# Patient Record
Sex: FEMALE | Race: WHITE | NOT HISPANIC OR LATINO
[De-identification: names, ages, dates, MRNs, and addresses within clinical notes are randomized per-mention and may not be internally consistent; named-entity substitution may affect disease eponyms.]

---

## 2022-04-18 PROBLEM — Z00.00 ENCOUNTER FOR PREVENTIVE HEALTH EXAMINATION: Status: ACTIVE | Noted: 2022-04-18

## 2022-04-27 ENCOUNTER — ASOB RESULT (OUTPATIENT)
Age: 32
End: 2022-04-27

## 2022-04-27 ENCOUNTER — APPOINTMENT (OUTPATIENT)
Dept: ANTEPARTUM | Facility: CLINIC | Age: 32
End: 2022-04-27
Payer: SELF-PAY

## 2022-04-27 PROCEDURE — 36416 COLLJ CAPILLARY BLOOD SPEC: CPT

## 2022-04-27 PROCEDURE — 76801 OB US < 14 WKS SINGLE FETUS: CPT

## 2022-04-27 PROCEDURE — 76813 OB US NUCHAL MEAS 1 GEST: CPT

## 2022-06-16 ENCOUNTER — APPOINTMENT (OUTPATIENT)
Dept: ANTEPARTUM | Facility: CLINIC | Age: 32
End: 2022-06-16
Payer: COMMERCIAL

## 2022-06-16 ENCOUNTER — ASOB RESULT (OUTPATIENT)
Age: 32
End: 2022-06-16

## 2022-06-16 PROCEDURE — 76805 OB US >/= 14 WKS SNGL FETUS: CPT

## 2022-09-30 ENCOUNTER — ASOB RESULT (OUTPATIENT)
Age: 32
End: 2022-09-30

## 2022-09-30 ENCOUNTER — APPOINTMENT (OUTPATIENT)
Dept: ANTEPARTUM | Facility: CLINIC | Age: 32
End: 2022-09-30

## 2022-09-30 PROCEDURE — 76819 FETAL BIOPHYS PROFIL W/O NST: CPT

## 2022-09-30 PROCEDURE — 99212 OFFICE O/P EST SF 10 MIN: CPT | Mod: 25

## 2022-09-30 PROCEDURE — 76816 OB US FOLLOW-UP PER FETUS: CPT | Mod: 59

## 2022-10-14 ENCOUNTER — TRANSCRIPTION ENCOUNTER (OUTPATIENT)
Age: 32
End: 2022-10-14

## 2022-10-24 ENCOUNTER — APPOINTMENT (OUTPATIENT)
Dept: ANTEPARTUM | Facility: CLINIC | Age: 32
End: 2022-10-24

## 2022-11-09 ENCOUNTER — TRANSCRIPTION ENCOUNTER (OUTPATIENT)
Age: 32
End: 2022-11-09

## 2022-11-09 ENCOUNTER — INPATIENT (INPATIENT)
Facility: HOSPITAL | Age: 32
LOS: 2 days | Discharge: HOME | End: 2022-11-12
Attending: STUDENT IN AN ORGANIZED HEALTH CARE EDUCATION/TRAINING PROGRAM | Admitting: STUDENT IN AN ORGANIZED HEALTH CARE EDUCATION/TRAINING PROGRAM

## 2022-11-09 DIAGNOSIS — Z3A.00 WEEKS OF GESTATION OF PREGNANCY NOT SPECIFIED: ICD-10-CM

## 2022-11-09 DIAGNOSIS — O26.899 OTHER SPECIFIED PREGNANCY RELATED CONDITIONS, UNSPECIFIED TRIMESTER: ICD-10-CM

## 2022-11-10 ENCOUNTER — RESULT REVIEW (OUTPATIENT)
Age: 32
End: 2022-11-10

## 2022-11-10 ENCOUNTER — TRANSCRIPTION ENCOUNTER (OUTPATIENT)
Age: 32
End: 2022-11-10

## 2022-11-10 VITALS — SYSTOLIC BLOOD PRESSURE: 114 MMHG | TEMPERATURE: 98 F | DIASTOLIC BLOOD PRESSURE: 57 MMHG | HEART RATE: 67 BPM

## 2022-11-10 LAB
BASOPHILS # BLD AUTO: 0.03 K/UL — SIGNIFICANT CHANGE UP (ref 0–0.2)
BASOPHILS NFR BLD AUTO: 0.3 % — SIGNIFICANT CHANGE UP (ref 0–2)
BLD GP AB SCN SERPL QL: NEGATIVE — SIGNIFICANT CHANGE UP
COVID-19 SPIKE DOMAIN AB INTERP: POSITIVE
COVID-19 SPIKE DOMAIN ANTIBODY RESULT: >250 U/ML — HIGH
EOSINOPHIL # BLD AUTO: 0.03 K/UL — SIGNIFICANT CHANGE UP (ref 0–0.5)
EOSINOPHIL NFR BLD AUTO: 0.3 % — SIGNIFICANT CHANGE UP (ref 0–6)
HCT VFR BLD CALC: 37.5 % — SIGNIFICANT CHANGE UP (ref 34.5–45)
HGB BLD-MCNC: 13 G/DL — SIGNIFICANT CHANGE UP (ref 11.5–15.5)
IANC: 8.47 K/UL — HIGH (ref 1.8–7.4)
IMM GRANULOCYTES NFR BLD AUTO: 0.7 % — SIGNIFICANT CHANGE UP (ref 0–0.9)
LYMPHOCYTES # BLD AUTO: 18.1 % — SIGNIFICANT CHANGE UP (ref 13–44)
LYMPHOCYTES # BLD AUTO: 2.01 K/UL — SIGNIFICANT CHANGE UP (ref 1–3.3)
MCHC RBC-ENTMCNC: 29.9 PG — SIGNIFICANT CHANGE UP (ref 27–34)
MCHC RBC-ENTMCNC: 34.7 GM/DL — SIGNIFICANT CHANGE UP (ref 32–36)
MCV RBC AUTO: 86.2 FL — SIGNIFICANT CHANGE UP (ref 80–100)
MONOCYTES # BLD AUTO: 0.48 K/UL — SIGNIFICANT CHANGE UP (ref 0–0.9)
MONOCYTES NFR BLD AUTO: 4.3 % — SIGNIFICANT CHANGE UP (ref 2–14)
NEUTROPHILS # BLD AUTO: 8.47 K/UL — HIGH (ref 1.8–7.4)
NEUTROPHILS NFR BLD AUTO: 76.3 % — SIGNIFICANT CHANGE UP (ref 43–77)
NRBC # BLD: 0 /100 WBCS — SIGNIFICANT CHANGE UP (ref 0–0)
NRBC # FLD: 0 K/UL — SIGNIFICANT CHANGE UP (ref 0–0)
PLATELET # BLD AUTO: 174 K/UL — SIGNIFICANT CHANGE UP (ref 150–400)
RBC # BLD: 4.35 M/UL — SIGNIFICANT CHANGE UP (ref 3.8–5.2)
RBC # FLD: 12.9 % — SIGNIFICANT CHANGE UP (ref 10.3–14.5)
RH IG SCN BLD-IMP: NEGATIVE — SIGNIFICANT CHANGE UP
RH IG SCN BLD-IMP: NEGATIVE — SIGNIFICANT CHANGE UP
SARS-COV-2 IGG+IGM SERPL QL IA: >250 U/ML — HIGH
SARS-COV-2 IGG+IGM SERPL QL IA: POSITIVE
T PALLIDUM AB TITR SER: NEGATIVE — SIGNIFICANT CHANGE UP
WBC # BLD: 11.1 K/UL — HIGH (ref 3.8–10.5)
WBC # FLD AUTO: 11.1 K/UL — HIGH (ref 3.8–10.5)

## 2022-11-10 RX ORDER — TETANUS TOXOID, REDUCED DIPHTHERIA TOXOID AND ACELLULAR PERTUSSIS VACCINE, ADSORBED 5; 2.5; 8; 8; 2.5 [IU]/.5ML; [IU]/.5ML; UG/.5ML; UG/.5ML; UG/.5ML
0.5 SUSPENSION INTRAMUSCULAR ONCE
Refills: 0 | Status: DISCONTINUED | OUTPATIENT
Start: 2022-11-10 | End: 2022-11-12

## 2022-11-10 RX ORDER — ERTAPENEM SODIUM 1 G/1
1000 INJECTION, POWDER, LYOPHILIZED, FOR SOLUTION INTRAMUSCULAR; INTRAVENOUS EVERY 24 HOURS
Refills: 0 | Status: COMPLETED | OUTPATIENT
Start: 2022-11-11 | End: 2022-11-11

## 2022-11-10 RX ORDER — DIPHENHYDRAMINE HCL 50 MG
25 CAPSULE ORAL EVERY 6 HOURS
Refills: 0 | Status: DISCONTINUED | OUTPATIENT
Start: 2022-11-10 | End: 2022-11-12

## 2022-11-10 RX ORDER — LANOLIN
1 OINTMENT (GRAM) TOPICAL EVERY 6 HOURS
Refills: 0 | Status: DISCONTINUED | OUTPATIENT
Start: 2022-11-10 | End: 2022-11-12

## 2022-11-10 RX ORDER — IBUPROFEN 200 MG
600 TABLET ORAL EVERY 6 HOURS
Refills: 0 | Status: COMPLETED | OUTPATIENT
Start: 2022-11-10 | End: 2023-10-09

## 2022-11-10 RX ORDER — SODIUM CHLORIDE 9 MG/ML
1000 INJECTION, SOLUTION INTRAVENOUS
Refills: 0 | Status: DISCONTINUED | OUTPATIENT
Start: 2022-11-10 | End: 2022-11-10

## 2022-11-10 RX ORDER — LANOLIN
1 OINTMENT (GRAM) TOPICAL
Qty: 0 | Refills: 0 | DISCHARGE
Start: 2022-11-10

## 2022-11-10 RX ORDER — MAGNESIUM HYDROXIDE 400 MG/1
30 TABLET, CHEWABLE ORAL
Refills: 0 | Status: DISCONTINUED | OUTPATIENT
Start: 2022-11-10 | End: 2022-11-12

## 2022-11-10 RX ORDER — SIMETHICONE 80 MG/1
80 TABLET, CHEWABLE ORAL EVERY 4 HOURS
Refills: 0 | Status: DISCONTINUED | OUTPATIENT
Start: 2022-11-10 | End: 2022-11-12

## 2022-11-10 RX ORDER — IBUPROFEN 200 MG
1 TABLET ORAL
Qty: 0 | Refills: 0 | DISCHARGE
Start: 2022-11-10

## 2022-11-10 RX ORDER — ERTAPENEM SODIUM 1 G/1
1000 INJECTION, POWDER, LYOPHILIZED, FOR SOLUTION INTRAMUSCULAR; INTRAVENOUS ONCE
Refills: 0 | Status: COMPLETED | OUTPATIENT
Start: 2022-11-10 | End: 2022-11-10

## 2022-11-10 RX ORDER — OXYCODONE HYDROCHLORIDE 5 MG/1
5 TABLET ORAL ONCE
Refills: 0 | Status: DISCONTINUED | OUTPATIENT
Start: 2022-11-10 | End: 2022-11-12

## 2022-11-10 RX ORDER — HEPARIN SODIUM 5000 [USP'U]/ML
5000 INJECTION INTRAVENOUS; SUBCUTANEOUS EVERY 12 HOURS
Refills: 0 | Status: DISCONTINUED | OUTPATIENT
Start: 2022-11-10 | End: 2022-11-12

## 2022-11-10 RX ORDER — OXYTOCIN 10 UNIT/ML
333.33 VIAL (ML) INJECTION
Qty: 20 | Refills: 0 | Status: DISCONTINUED | OUTPATIENT
Start: 2022-11-10 | End: 2022-11-10

## 2022-11-10 RX ORDER — OXYCODONE HYDROCHLORIDE 5 MG/1
5 TABLET ORAL
Refills: 0 | Status: DISCONTINUED | OUTPATIENT
Start: 2022-11-10 | End: 2022-11-12

## 2022-11-10 RX ORDER — ACETAMINOPHEN 500 MG
975 TABLET ORAL
Refills: 0 | Status: DISCONTINUED | OUTPATIENT
Start: 2022-11-10 | End: 2022-11-12

## 2022-11-10 RX ORDER — ERTAPENEM SODIUM 1 G/1
INJECTION, POWDER, LYOPHILIZED, FOR SOLUTION INTRAMUSCULAR; INTRAVENOUS
Refills: 0 | Status: COMPLETED | OUTPATIENT
Start: 2022-11-10 | End: 2022-11-11

## 2022-11-10 RX ORDER — SIMETHICONE 80 MG/1
1 TABLET, CHEWABLE ORAL
Qty: 0 | Refills: 0 | DISCHARGE
Start: 2022-11-10

## 2022-11-10 RX ORDER — CHLORHEXIDINE GLUCONATE 213 G/1000ML
1 SOLUTION TOPICAL ONCE
Refills: 0 | Status: DISCONTINUED | OUTPATIENT
Start: 2022-11-10 | End: 2022-11-10

## 2022-11-10 RX ORDER — CITRIC ACID/SODIUM CITRATE 300-500 MG
15 SOLUTION, ORAL ORAL EVERY 6 HOURS
Refills: 0 | Status: DISCONTINUED | OUTPATIENT
Start: 2022-11-10 | End: 2022-11-10

## 2022-11-10 RX ORDER — KETOROLAC TROMETHAMINE 30 MG/ML
30 SYRINGE (ML) INJECTION EVERY 6 HOURS
Refills: 0 | Status: DISCONTINUED | OUTPATIENT
Start: 2022-11-10 | End: 2022-11-11

## 2022-11-10 RX ORDER — INFLUENZA VIRUS VACCINE 15; 15; 15; 15 UG/.5ML; UG/.5ML; UG/.5ML; UG/.5ML
0.5 SUSPENSION INTRAMUSCULAR ONCE
Refills: 0 | Status: COMPLETED | OUTPATIENT
Start: 2022-11-10 | End: 2022-11-11

## 2022-11-10 RX ADMIN — Medication 30 MILLIGRAM(S): at 18:30

## 2022-11-10 RX ADMIN — Medication 975 MILLIGRAM(S): at 20:20

## 2022-11-10 RX ADMIN — Medication 975 MILLIGRAM(S): at 15:35

## 2022-11-10 RX ADMIN — ERTAPENEM SODIUM 1000 MILLIGRAM(S): 1 INJECTION, POWDER, LYOPHILIZED, FOR SOLUTION INTRAMUSCULAR; INTRAVENOUS at 10:47

## 2022-11-10 RX ADMIN — Medication 975 MILLIGRAM(S): at 15:02

## 2022-11-10 RX ADMIN — Medication 975 MILLIGRAM(S): at 21:00

## 2022-11-10 RX ADMIN — Medication 1000 MILLIUNIT(S)/MIN: at 10:47

## 2022-11-10 RX ADMIN — HEPARIN SODIUM 5000 UNIT(S): 5000 INJECTION INTRAVENOUS; SUBCUTANEOUS at 15:30

## 2022-11-10 RX ADMIN — Medication 30 MILLIGRAM(S): at 23:43

## 2022-11-10 RX ADMIN — Medication 15 MILLILITER(S): at 06:33

## 2022-11-10 RX ADMIN — Medication 30 MILLIGRAM(S): at 18:00

## 2022-11-10 NOTE — OB PROVIDER LABOR PROGRESS NOTE - ASSESSMENT
Labor  - Pt is making cervical change  - Continue expt mgmt    Laurie Gudino, PGY1  d/w Dr. Iyer
-pt in labor  -terb given x1  -pt repositioned  -cw FRT monitoring, reassess situation as indicated.     Gerry Blanco PGY 1

## 2022-11-10 NOTE — OB RN PATIENT PROFILE - FALL HARM RISK - UNIVERSAL INTERVENTIONS
Bed in lowest position, wheels locked, appropriate side rails in place/Call bell, personal items and telephone in reach/Instruct patient to call for assistance before getting out of bed or chair/Non-slip footwear when patient is out of bed/Fox to call system/Physically safe environment - no spills, clutter or unnecessary equipment/Purposeful Proactive Rounding/Room/bathroom lighting operational, light cord in reach

## 2022-11-10 NOTE — OB PROVIDER DELIVERY SUMMARY - NSPROVIDERDELIVERYNOTE_OBGYN_ALL_OB_FT
Delivery of live born Male infant.  APGARS 9/9.  3740g.  Vertex presentation.  Emergent delivery due to abnormal fetal status.    Vaccuum assisted delivery through hysterotomy.  Hysterotomy closed in single layer.  Surgicell powder placed over hysterotomy.  Grossly normal tubes and ovaries.  QBL: 237  IVF: 1500  UOP: 350    Dictation #[id number] ([place]deep)

## 2022-11-10 NOTE — DISCHARGE NOTE OB - MEDICATION SUMMARY - MEDICATIONS TO TAKE
I will START or STAY ON the medications listed below when I get home from the hospital:    ibuprofen 600 mg oral tablet  -- 1 tab(s) by mouth every 6 hours, As Needed  -- Indication: For pain    lanolin topical ointment  -- 1 application on skin every 6 hours, As needed, Sore Nipples  -- Indication: For breast nipple pain    simethicone 80 mg oral tablet, chewable  -- 1 tab(s) by mouth every 4 hours, As needed, Gas  -- Indication: For gas pain

## 2022-11-10 NOTE — OB RN INTRAOPERATIVE NOTE - NSSELHIDDEN_OBGYN_ALL_OB_FT
[NS_DeliveryAttending1_OBGYN_ALL_OB_FT:[email or web address]=],[NS_DeliveryRN_OBGYN_ALL_OB_FT:[id number]==]

## 2022-11-10 NOTE — CHART NOTE - NSCHARTNOTEFT_GEN_A_CORE
Patient seen and examined at bedside for prolonged deceleration. VE: 8/100/-2, AROM, IUPC and ISE placed. Tracing recovered to baseline with moderate variability. After AROM variable decelerations noted, amnioinfusion started. Continue to monitor closely.

## 2022-11-10 NOTE — OB NEONATOLOGY/PEDIATRICIAN DELIVERY SUMMARY - NSPEDSNEONOTESA_OBGYN_ALL_OB_FT
Peds called to delivery for  code 100 fetal bradycardia. NICU resuscitation team present: Fellow Dr. Plascencia and attending Dr. Rodriguez. 41.1 wk male born via emergency vacuum assisted CS popoffx1 to a 31 y/o  blood type B- mother, rhogam . No significant maternal history. Prenatal history of macrocephaly at 90th%ile, amnioinfusion at 0640 on 11/10. PNL -/-/NR/I, GBS - on 10/19. SROM at 0608 with clear fluids, approx. 3 hrs. Baby emerged vigorous, crying, was w/d/s/s with APGARS of 9/9. Mom plans to breast feed, declines Hep B vaccine and declines circ. EOS 0.10. COVID negative. Baby stable for admission to  nursery. Parents updated.  BW: 3740

## 2022-11-10 NOTE — CHART NOTE - NSCHARTNOTEFT_GEN_A_CORE
Patient seen and examined at bedside for prolonged deceleration. VE: 9.5/100/0, IUPC/ISE in place, amnioinfusion running. Terbutaline 0.25mg x1 given. FHR recovered with moderate variability. Moved into right lateral position. Continue to monitor closely.

## 2022-11-10 NOTE — OB PROVIDER DELIVERY SUMMARY - NSSELHIDDEN_OBGYN_ALL_OB_FT
[NS_DeliveryAttending1_OBGYN_ALL_OB_FT:[email or web address]=],[NS_DeliveryRN_OBGYN_ALL_OB_FT:[id number]==]

## 2022-11-10 NOTE — DISCHARGE NOTE OB - CARE PROVIDER_API CALL
Sim Collado)  Obstetrics and Gynecology  [address] [place] [address]  Phone: [phone]  Fax: [phone]  Follow Up Time: 2 weeks

## 2022-11-10 NOTE — DISCHARGE NOTE OB - PATIENT PORTAL LINK FT
You can access the FollowMyHealth Patient Portal offered by [facility] by registering at the following website: [email or web address]. By joining [employer]’s FollowMyHealth portal, you will also be able to view your health information using other applications (apps) compatible with our system.

## 2022-11-10 NOTE — OB PROVIDER H&P - HISTORY OF PRESENT ILLNESS
31yo  @41w1d  p/f IOL for LT. She has been feeling CTX since 6pm that were 7-8min apart. They are now coming 2 min apart. –VB, -LOF, +FM. Pt denies fever, chills, nausea, vomiting, diarrhea, headache, constipation, dizziness, syncope, chest pain, palpitations, shortness of breath, dysuria, urgency, frequency.  PNC: unc  GBS: neg  EFW: 9#  ObHx:   GynHx: denies  MedHx: denies  SrgHx: denies  PsychHx: denies  SocialHx: denies  AllergyHx: NKDA  RxHx: took PNV at beginning of pregnancy but has since discontinued

## 2022-11-10 NOTE — OB PROVIDER H&P - ASSESSMENT
31yo  @41w1d initially presented for LT IOL, found to be in active labor  -admit to L&D  - GBS neg  - EFW 4082  - Routine Labs  - FHT/TOCO  - Anesthesia Consult  - Anticipate     Laurie Gudino, PGY1  d/w Dr. Iyer

## 2022-11-10 NOTE — OB RN DELIVERY SUMMARY - NSSELHIDDEN_OBGYN_ALL_OB_FT
[NS_DeliveryAttending1_OBGYN_ALL_OB_FT:[email or web address]=],[NS_DeliveryRN_OBGYN_ALL_OB_FT:[id number]]

## 2022-11-10 NOTE — DISCHARGE NOTE OB - NS AS DC FOLLOWUP STROKE INST
Influenza vaccination (VIS Pub Date: August 6, 2021) pp educational materials provided/Influenza vaccination (VIS Pub Date: August 6, 2021)

## 2022-11-10 NOTE — OB RN DELIVERY SUMMARY - NS_SEPSISRSKCALC_OBGYN_ALL_OB_FT
EOS calculated successfully. EOS Risk Factor: 0.5/1000 live births ([facility] national incidence); GA=41w1d; Temp=98.42; ROM=2.817; GBS='Negative'; Antibiotics='No antibiotics or any antibiotics < 2 hrs prior to birth'

## 2022-11-10 NOTE — DISCHARGE NOTE OB - MATERIALS PROVIDED
[facility] Screening Program/Guide to Postpartum Care/Shaken Baby Prevention Handout/Birth Certificate Instructions

## 2022-11-10 NOTE — OB PROVIDER LABOR PROGRESS NOTE - NS_SUBJECTIVE/OBJECTIVE_OBGYN_ALL_OB_FT
Patient seen for late decel for 2 min, nursing and MDs entered room repositioned pt and terb x1 was given. FRT recovered and pt clinically stable.
Pt checked for labor progress

## 2022-11-10 NOTE — OB PROVIDER LABOR PROGRESS NOTE - NS_EFFACEMANT_OBGYN_ALL_OB_NU
9/14/2021         RE: Ed Rudolph  [address]  [facility] [address]        Dear Colleague,    Thank you for referring your patient, Ed Rudolph, to the [facility] [place]. Please see a copy of my visit note below.    ___________________________________    SUBJECTIVE:  HPI:  Ed Rudolph  Is a 62 year old male who presents today for new patient evaluation of low back pain onset was 3 months ago after he helped a friend move furniture and boxes.  The next day he was stiff in his low back and gradually over the next few days he started noticing right lateral calf pain.  That has progressed to some tingling in the lateral calf into all 5 toes of the foot.  He has not noticed weakness, bowel or bladder dysfunction, sexual dysfunction, or saddle anesthesia.  There is been no change in his balance.  His sensation in the foot is normal although it tingles.    Symptoms improved with sitting  Symptoms worsened with walking, sleeping, and bending.    -Treatment to Date: He tried meloxicam but stopped that because it made him sleepy.  He is taking tizanidine currently and thinks it is helping.  He has not had physical therapy or chiropractic care.  MEDICATIONS:  Reviewed.     ALLERGIES:  Reviewed.   PAIN SCORE/DIAGRAM pain diagram shows pain in the right SI joint radiating diffusely down the right leg.  Pain initially 7, worst 9, best 8.  Following OMT, pain went to 4-5  OSWESTRY DISABILITY INDEX = 40%    Current Outpatient Medications   Medication     lisinopril-hydrochlorothiazide (ZESTORETIC) 20-25 MG tablet     meloxicam (MOBIC) 15 MG tablet     Multiple Vitamin (DAILY VITAMINS PO)     simvastatin (ZOCOR) 40 MG tablet     tiZANidine (ZANAFLEX) 2 MG tablet     traZODone (DESYREL) 50 MG tablet     No current facility-administered medications for this visit.       No Known Allergies    Past Medical History:   Diagnosis Date     Adjustment disorder with depressed 
mood 1/12/2016     Carpal tunnel syndrome 1/5/2007     Chest wall contusion 12/2/2015     Depression with suicidal ideation 12/21/2015     Erectile Dysfunction 1/5/2007     Hyperlipidemia LDL goal <130 10/31/2010     Hypertension goal BP (blood pressure) < 140/90 5/11/2011     Impaired fasting glucose      LEFT FRONTAL LOBE ABSCESS 1/03    treated surgically, resolved     LFT elevation 5/30/2012        Patient Active Problem List   Diagnosis     Erectile Dysfunction     Overweight (BMI 25.0-29.9)     Hyperlipidemia LDL goal <100     Essential hypertension with goal blood pressure less than 140/90     Impaired fasting glucose     High triglycerides     Prostatic nodule     RLS (restless legs syndrome)     Primary insomnia     Skin cyst     Elevated ferritin     Keloid scar, right neck     Fall     Major depressive disorder, recurrent episode (H)     Slac (scapholunate advanced collapse) of wrist, left     Alcohol dependence (H)       Past Surgical History:   Procedure Laterality Date     COLONOSCOPY WITH CO2 INSUFFLATION N/A 4/12/2018    Procedure: COLONOSCOPY WITH CO2 INSUFFLATION;  COLON SCREEN/ ARREDONDO;  Surgeon: Quintin Patton MD;  Location: [facility] OR     CRANIOTOMY  01/2003    x 2, for treatment of brain abscess       Family History   Adopted: Yes   Problem Relation Age of Onset     Family History Negative No family hx of         Unknown, adopted       Reviewed past medical, surgical, and family history with patient found on new patient intake packet located in EMR Media tab.     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  He moved here from [place] approximately 15 years ago.  He used to play football but not any longer.  He still does shoot around some basketballs and did that a few days ago.    ROS:Specifically negative for bowel/bladder dysfunction, balance changes, headache, dizziness, foot drop, fevers, chills, appetite changes, nausea/vomiting, unexplained weight loss. 
Otherwise 13 systems reviewed and noncontributory to the chief complaint. Please see the patient's intake questionnaire from today for details.    OBJECTIVE:  Vital signs Per the nurse's note    PHYSICAL EXAMINATION:    --CONSTITUTIONAL:  Vital signs as above.  No acute distress.  The patient is well nourished and well groomed.  --PSYCHIATRIC:  Appropriate mood and affect. The patient is awake, alert, oriented to person, place, time and answering questions appropriately with clear speech.    --SKIN:  Skin over the face, bilateral lower extremities, and posterior torso is clean, dry, intact without rashes.    --RESPIRATORY: Normal rhythm and effort. No abnormal accessory muscle breathing patterns noted.   --ABDOMINAL:  Non-distended.  Nontender  --STANDING EXAMINATION:  Normal lumbar lordosis noted, no lateral shift.  --MUSCULOSKELETAL: Lumbar spine inspection reveals no evidence of deformity. Range of motion is initially limited in lumbar flexion with moderate pain hands to the knees.  Extension was 50% reduced side bending and rotation were normal and painless.. No tenderness to palpation lumbar spine. Straight leg raising in the seated position is was initially positive for roughly right L5 radicular pain on the right and was negative on the left.  Kemps test was positive to the right and both before and after manual medicine    Following manual medicine, range of motion was full in flexion with minimal pain and extension range was normal.  --PELVIC/HIP JOINTS:  Standing flexion positive right, stork test negative, Brenda's sign negative.  Leg lengths initially uneven, but became equal after manual medicine.   Long Sitting test right leg goes from 1 cm short to 2 cm short, and this resolved after manual medicine to equal.  Negative Hector's, negative piriformis, negative hip scour,   Hip Impingement, negative.  Spring testing positive over the right SI joint for pulse provocative pain, slightly positive over the 
L3-L5 spinous processes for nonconcordant pain..      PELVIC ALIGNMENT right posterior innominate rotation.         --NEUROLOGICAL:    --LOWER EXTREMITY MOTOR TESTING:  REFLEXES:  2/4 patellar,  And 0/4 achilles reflexes bilaterally.  GROSS MOTOR: Gait is non-antalgic. Easily arises from a seated position. Flat foot, heel and toe walking, squatting and risng are normal without significant difficulty.  Duck walk was painful on the right  MANUAL MOTOR TESTING:  L3- S1 Myotomes 5/5.  SENSATION to light touch is diminished in the right foot and roughly an L5  distribution, but otherwise was intact in the bilateral L4, L5, and S1  dermatomes.     DURAL STRETCH TESTS:  Seated SLR positive on the right, increased with ankle dorsiflexion, and became negative after OMT.  --VASCULAR: Femoral pulses 2+. Lower extremity capillary refill, temperature and color normal.     RESULTS: Prior medical records from [facility] Everywhere were reviewed today.    Imaging: Lumbar spine Imaging was personally reviewed and interpreted today. The images were shown to the patient and the findings were explained using a spine model.      MR Lumbar Spine w/o Contrast    Result Date: 8/27/2021  MR LUMBAR SPINE WITHOUT CONTRAST 8/26/2021   IMPRESSION: 1.  Multilevel degenerative disc disease is moderate at L5-S1 and more mild elsewhere. Accompanying small diffuse posterior disc bulges contribute to a multilevel mild spinal canal stenosis. 2.  Type I Modic endplate degenerative change at L5-S1 with smaller components of type I Modic endplate degenerative change anteriorly at L1-L2 and T11-T12. Additional degenerative osseous edema at the bilateral L3-L4 and left L4-L5 facets. 3.  Facet arthropathy is most pronounced at the L3-L4 level, where it is moderate bilaterally. 4.  Moderate right and moderate-to-severe left L5-S1 neural foraminal stenosis with contact of the exiting left L5 nerve. Additional mild-to-moderate bilateral L4-L5 
neural foraminal stenosis with mild-to-moderate left L3-L4 neural foraminal stenosis. 5.  Chronic compression fracture with mild height loss along the superior endplate of L1. 6.  Epidural lipomatosis throughout the lumbar spine with an accompanying thecal sac effacement as detailed above.    PROCEDURE NOTE: OMT to the right posterior innominate rotation realigned the pelvic structures.  Improvement in range of motion and function noted.  Diminished sensation in a right L5 pattern improved but was still somewhat abnormal in the foot and toe compared to previous.  The leg raise testing improved and was normal afterwards.    ASSESSMENT: Ed Rudolph is a 62 year old male who presents for consultation at the request of PCP Aris Garcia,who presents today for new patient evaluation of low back pain with right L5 radicular symptoms of 3 months duration.  He also had a pelvic joint dysfunction today manifesting as a right posterior innominate rotation.  He responded nicely to an attempt at manual medicine with resolution of some of his neurologic findings and improvement in his L5 symptoms as well as low back pain.  His function also improved in the room.   No myelopathic or red flag symptoms.        There are no diagnoses linked to this encounter.  PLAN:  Reviewed spine anatomy and disease process. Discussed diagnosis and treatment options with the patient today.   -DIAGNOSTIC TESTS:  Images were personally reviewed and interpreted and explained to patient today using spine model.   No further testing today    -PHYSICAL THERAPY: None ordered for now but we will reconsider this after the next visit  Discussed the importance of easy walking on level ground, gradually increasing bending stooping and twisting as tolerated, but avoiding more than 20 pounds of lifting for now.  -MEDICATIONS: I advised holding on the tizanidine unless he notices a significant increase in pain off of it.  He has already stopped the 
meloxicam.  Discussed multiple medication options today with patient. Discussed risks, side effects, and proper use of medications. Patient verbalized understanding.    -PATIENT EDUCATION:  Total time of 60 minutes spent with the patient, reviewing the chart, placing orders, counseling, education, coordination of care, and documenting.     -FOLLOW-UP: 2 weeks  Advised patient to call the [facility] if symptoms worsen or you have problems controlling bladder and bowel function.   ___________________________________             Again, thank you for allowing me to participate in the care of your patient.        Sincerely,        Sravan Kumar MD    
100
90

## 2022-11-11 LAB
BASOPHILS # BLD AUTO: 0.01 K/UL — SIGNIFICANT CHANGE UP (ref 0–0.2)
BASOPHILS NFR BLD AUTO: 0.1 % — SIGNIFICANT CHANGE UP (ref 0–2)
EOSINOPHIL # BLD AUTO: 0.03 K/UL — SIGNIFICANT CHANGE UP (ref 0–0.5)
EOSINOPHIL NFR BLD AUTO: 0.3 % — SIGNIFICANT CHANGE UP (ref 0–6)
HCT VFR BLD CALC: 32 % — LOW (ref 34.5–45)
HGB BLD-MCNC: 10.8 G/DL — LOW (ref 11.5–15.5)
IANC: 8.04 K/UL — HIGH (ref 1.8–7.4)
IMM GRANULOCYTES NFR BLD AUTO: 0.5 % — SIGNIFICANT CHANGE UP (ref 0–0.9)
KLEIHAUER-BETKE CALCULATION: 0 % — SIGNIFICANT CHANGE UP
LYMPHOCYTES # BLD AUTO: 1.42 K/UL — SIGNIFICANT CHANGE UP (ref 1–3.3)
LYMPHOCYTES # BLD AUTO: 14.1 % — SIGNIFICANT CHANGE UP (ref 13–44)
MCHC RBC-ENTMCNC: 29.8 PG — SIGNIFICANT CHANGE UP (ref 27–34)
MCHC RBC-ENTMCNC: 33.8 GM/DL — SIGNIFICANT CHANGE UP (ref 32–36)
MCV RBC AUTO: 88.4 FL — SIGNIFICANT CHANGE UP (ref 80–100)
MONOCYTES # BLD AUTO: 0.54 K/UL — SIGNIFICANT CHANGE UP (ref 0–0.9)
MONOCYTES NFR BLD AUTO: 5.4 % — SIGNIFICANT CHANGE UP (ref 2–14)
NEUTROPHILS # BLD AUTO: 8.04 K/UL — HIGH (ref 1.8–7.4)
NEUTROPHILS NFR BLD AUTO: 79.6 % — HIGH (ref 43–77)
NRBC # BLD: 0 /100 WBCS — SIGNIFICANT CHANGE UP (ref 0–0)
NRBC # FLD: 0 K/UL — SIGNIFICANT CHANGE UP (ref 0–0)
PLATELET # BLD AUTO: 151 K/UL — SIGNIFICANT CHANGE UP (ref 150–400)
RBC # BLD: 3.62 M/UL — LOW (ref 3.8–5.2)
RBC # FLD: 13.2 % — SIGNIFICANT CHANGE UP (ref 10.3–14.5)
WBC # BLD: 10.09 K/UL — SIGNIFICANT CHANGE UP (ref 3.8–10.5)
WBC # FLD AUTO: 10.09 K/UL — SIGNIFICANT CHANGE UP (ref 3.8–10.5)

## 2022-11-11 RX ORDER — IBUPROFEN 200 MG
600 TABLET ORAL EVERY 6 HOURS
Refills: 0 | Status: DISCONTINUED | OUTPATIENT
Start: 2022-11-11 | End: 2022-11-12

## 2022-11-11 RX ADMIN — SIMETHICONE 80 MILLIGRAM(S): 80 TABLET, CHEWABLE ORAL at 17:57

## 2022-11-11 RX ADMIN — Medication 975 MILLIGRAM(S): at 21:45

## 2022-11-11 RX ADMIN — HEPARIN SODIUM 5000 UNIT(S): 5000 INJECTION INTRAVENOUS; SUBCUTANEOUS at 03:37

## 2022-11-11 RX ADMIN — Medication 975 MILLIGRAM(S): at 03:37

## 2022-11-11 RX ADMIN — SIMETHICONE 80 MILLIGRAM(S): 80 TABLET, CHEWABLE ORAL at 12:45

## 2022-11-11 RX ADMIN — Medication 30 MILLIGRAM(S): at 00:28

## 2022-11-11 RX ADMIN — Medication 975 MILLIGRAM(S): at 21:05

## 2022-11-11 RX ADMIN — INFLUENZA VIRUS VACCINE 0.5 MILLILITER(S): 15; 15; 15; 15 SUSPENSION INTRAMUSCULAR at 21:05

## 2022-11-11 RX ADMIN — Medication 975 MILLIGRAM(S): at 08:30

## 2022-11-11 RX ADMIN — HEPARIN SODIUM 5000 UNIT(S): 5000 INJECTION INTRAVENOUS; SUBCUTANEOUS at 15:35

## 2022-11-11 RX ADMIN — Medication 600 MILLIGRAM(S): at 17:55

## 2022-11-11 RX ADMIN — ERTAPENEM SODIUM 120 MILLIGRAM(S): 1 INJECTION, POWDER, LYOPHILIZED, FOR SOLUTION INTRAMUSCULAR; INTRAVENOUS at 09:38

## 2022-11-11 RX ADMIN — Medication 600 MILLIGRAM(S): at 18:25

## 2022-11-11 RX ADMIN — Medication 30 MILLIGRAM(S): at 06:00

## 2022-11-11 RX ADMIN — Medication 975 MILLIGRAM(S): at 15:35

## 2022-11-11 RX ADMIN — Medication 975 MILLIGRAM(S): at 09:00

## 2022-11-11 RX ADMIN — Medication 30 MILLIGRAM(S): at 05:45

## 2022-11-11 RX ADMIN — Medication 600 MILLIGRAM(S): at 12:44

## 2022-11-11 RX ADMIN — Medication 975 MILLIGRAM(S): at 04:10

## 2022-11-11 RX ADMIN — Medication 975 MILLIGRAM(S): at 16:05

## 2022-11-11 RX ADMIN — Medication 600 MILLIGRAM(S): at 13:15

## 2022-11-12 VITALS
RESPIRATION RATE: 18 BRPM | HEART RATE: 82 BPM | DIASTOLIC BLOOD PRESSURE: 75 MMHG | OXYGEN SATURATION: 100 % | SYSTOLIC BLOOD PRESSURE: 122 MMHG | TEMPERATURE: 98 F

## 2022-11-12 RX ORDER — SENNA PLUS 8.6 MG/1
1 TABLET ORAL
Refills: 0 | Status: DISCONTINUED | OUTPATIENT
Start: 2022-11-12 | End: 2022-11-12

## 2022-11-12 RX ORDER — FERROUS SULFATE 325(65) MG
325 TABLET ORAL DAILY
Refills: 0 | Status: DISCONTINUED | OUTPATIENT
Start: 2022-11-12 | End: 2022-11-12

## 2022-11-12 RX ADMIN — Medication 1 TABLET(S): at 15:04

## 2022-11-12 RX ADMIN — Medication 600 MILLIGRAM(S): at 06:00

## 2022-11-12 RX ADMIN — Medication 975 MILLIGRAM(S): at 03:45

## 2022-11-12 RX ADMIN — Medication 975 MILLIGRAM(S): at 03:04

## 2022-11-12 RX ADMIN — SIMETHICONE 80 MILLIGRAM(S): 80 TABLET, CHEWABLE ORAL at 06:22

## 2022-11-12 RX ADMIN — Medication 600 MILLIGRAM(S): at 00:45

## 2022-11-12 RX ADMIN — Medication 975 MILLIGRAM(S): at 15:03

## 2022-11-12 RX ADMIN — Medication 975 MILLIGRAM(S): at 10:40

## 2022-11-12 RX ADMIN — Medication 600 MILLIGRAM(S): at 06:45

## 2022-11-12 RX ADMIN — HEPARIN SODIUM 5000 UNIT(S): 5000 INJECTION INTRAVENOUS; SUBCUTANEOUS at 03:03

## 2022-11-12 RX ADMIN — Medication 975 MILLIGRAM(S): at 09:41

## 2022-11-12 RX ADMIN — Medication 600 MILLIGRAM(S): at 00:00

## 2022-11-12 NOTE — PROGRESS NOTE ADULT - SUBJECTIVE AND OBJECTIVE BOX
Pain Service Follow-up  Postop Day  1    S/P  C- Section    T(C): 36.7 (11-11-22 @ 06:14), Max: 36.9 (11-10-22 @ 13:50)  HR: 72 (11-11-22 @ 06:14) (64 - 100)  BP: 101/62 (11-11-22 @ 06:30) (95/75 - 150/83)  RR: 16 (11-11-22 @ 06:14) (9 - 23)  SpO2: 99% (11-11-22 @ 06:14) (96% - 100%)  Wt(kg): --      THERAPY:     S/P Epidural Morphine    Sedation Score:	  [X] Alert	      [  ] Drowsy       [  ] Arousable	[  ] Asleep         [  ] Unresponsive    Side Effects:	  [X] None	      [  ] Nausea       [  ] Pruritus        [  ] Weakness   [  ] Numbness        ASSESSMENT/ PLAN   [ X ] Discontinue         [  ] Continue    [ X ] Documentation and Verification of current medications       Satisfactory Post Anesthetic Course    
Post-Operative Note, C/S  She is a  32y woman who is now post-operative day: 2    Subjective:  The patient feels well.  She is ambulating.   She is tolerating regular diet.  She denies nausea and vomiting; denies fever.  She is voiding.  Her pain is controlled; incisional pain is appropriate.  She reports normal postpartum bleeding.  She is breastfeeding.  She is formula feeding.    Physical exam:    Vital Signs Last 24 Hrs  T(C): 36.7 (12 Nov 2022 06:13), Max: 36.8 (11 Nov 2022 10:30)  T(F): 98.1 (12 Nov 2022 06:13), Max: 98.3 (11 Nov 2022 10:30)  HR: 75 (12 Nov 2022 06:13) (74 - 81)  BP: 119/64 (12 Nov 2022 06:13) (109/64 - 125/71)  BP(mean): --  RR: 18 (12 Nov 2022 06:13) (17 - 18)  SpO2: 98% (12 Nov 2022 06:13) (98% - 99%)    Parameters below as of 11 Nov 2022 21:26  Patient On (Oxygen Delivery Method): room air        Gen: NAD  Breast: Soft, nontender, not engorged.  Abdomen: Soft, nontender, no distension , firm uterine fundus at umbilicus.  Incision: C/D/I.  Pelvic: Normal lochia noted  Ext: No calf tenderness    LABS:                        10.8   10.09 )-----------( 151      ( 11 Nov 2022 05:32 )             32.0       Rubella status:     Allergies    No Known Allergies    Intolerances      MEDICATIONS  (STANDING):  acetaminophen     Tablet .. 975 milliGRAM(s) Oral <User Schedule>  diphtheria/tetanus/pertussis (acellular) Vaccine (Adacel) 0.5 milliLiter(s) IntraMuscular once  ferrous    sulfate 325 milliGRAM(s) Oral daily  heparin   Injectable 5000 Unit(s) SubCutaneous every 12 hours  ibuprofen  Tablet. 600 milliGRAM(s) Oral every 6 hours  prenatal multivitamin 1 Tablet(s) Oral daily  senna 1 Tablet(s) Oral two times a day    MEDICATIONS  (PRN):  diphenhydrAMINE 25 milliGRAM(s) Oral every 6 hours PRN Pruritus  lanolin Ointment 1 Application(s) Topical every 6 hours PRN Sore Nipples  magnesium hydroxide Suspension 30 milliLiter(s) Oral two times a day PRN Constipation  oxyCODONE    IR 5 milliGRAM(s) Oral every 3 hours PRN Moderate to Severe Pain (4-10)  oxyCODONE    IR 5 milliGRAM(s) Oral once PRN Moderate to Severe Pain (4-10)  simethicone 80 milliGRAM(s) Chew every 4 hours PRN Gas            
Post-Operative Note, vacuum assisted C/S  She is a  32y woman who is now post-operative day: 1    Subjective:  The patient feels well.  She is ambulating.   She is tolerating regular diet.  She denies nausea and vomiting; denies fever.  She is voiding.  Her pain is controlled; incisional pain is appropriate.  She reports normal postpartum bleeding.  She is breastfeeding and formula feeding.    Physical exam:    Vital Signs Last 24 Hrs  T(C): 36.8 (11 Nov 2022 10:30), Max: 36.9 (10 Nov 2022 13:50)  T(F): 98.3 (11 Nov 2022 10:30), Max: 98.5 (10 Nov 2022 13:50)  HR: 81 (11 Nov 2022 10:30) (72 - 83)  BP: 125/71 (11 Nov 2022 10:30) (99/51 - 129/76)  BP(mean): 88 (10 Nov 2022 12:00) (88 - 88)  RR: 16 (11 Nov 2022 06:14) (16 - 18)  SpO2: 99% (11 Nov 2022 10:30) (96% - 99%)    Parameters below as of 10 Nov 2022 21:23  Patient On (Oxygen Delivery Method): room air        Gen: NAD  Breast: Soft, nontender, not engorged.  Abdomen: Soft, nontender, no distension , firm uterine fundus at umbilicus.  Incision: C/D/I.  Pelvic: Normal lochia noted  Ext: No calf tenderness    LABS:                        10.8   10.09 )-----------( 151      ( 11 Nov 2022 05:32 )             32.0         Allergies    No Known Allergies      MEDICATIONS  (STANDING):  acetaminophen     Tablet .. 975 milliGRAM(s) Oral <User Schedule>  diphtheria/tetanus/pertussis (acellular) Vaccine (Adacel) 0.5 milliLiter(s) IntraMuscular once  heparin   Injectable 5000 Unit(s) SubCutaneous every 12 hours  ibuprofen  Tablet. 600 milliGRAM(s) Oral every 6 hours  influenza   Vaccine 0.5 milliLiter(s) IntraMuscular once    MEDICATIONS  (PRN):  diphenhydrAMINE 25 milliGRAM(s) Oral every 6 hours PRN Pruritus  lanolin Ointment 1 Application(s) Topical every 6 hours PRN Sore Nipples  magnesium hydroxide Suspension 30 milliLiter(s) Oral two times a day PRN Constipation  oxyCODONE    IR 5 milliGRAM(s) Oral every 3 hours PRN Moderate to Severe Pain (4-10)  oxyCODONE    IR 5 milliGRAM(s) Oral once PRN Moderate to Severe Pain (4-10)  simethicone 80 milliGRAM(s) Chew every 4 hours PRN Gas              
OB Postpartum Note:  Delivery    S: 31yo now POD #1 s/p VA-pLTCS. Her pain is well controlled. She is tolerating a regular diet but has not yet passed flatus. Voiding spontaneously and ambulating without difficulty. Denies N/V. Denies CP/SOB/lightheadedness/dizziness/headache/epigastric pain/changes in vision.    O:   Vitals:  Vital Signs Last 24 Hrs  T(C): 36.7 (2022 06:14), Max: 36.9 (10 Nov 2022 13:50)  T(F): 98.1 (2022 06:14), Max: 98.5 (10 Nov 2022 13:50)  HR: 72 (2022 06:14) (64 - 83)  BP: 101/62 (2022 06:30) (95/75 - 129/76)  BP(mean): 88 (10 Nov 2022 12:00) (65 - 99)  RR: 16 (2022 06:14) (14 - 23)  SpO2: 99% (2022 06:14) (96% - 100%)    Parameters below as of 10 Nov 2022 21:23  Patient On (Oxygen Delivery Method): room air        MEDICATIONS  (STANDING):  acetaminophen     Tablet .. 975 milliGRAM(s) Oral <User Schedule>  diphtheria/tetanus/pertussis (acellular) Vaccine (Adacel) 0.5 milliLiter(s) IntraMuscular once  heparin   Injectable 5000 Unit(s) SubCutaneous every 12 hours  ibuprofen  Tablet. 600 milliGRAM(s) Oral every 6 hours  influenza   Vaccine 0.5 milliLiter(s) IntraMuscular once    MEDICATIONS  (PRN):  diphenhydrAMINE 25 milliGRAM(s) Oral every 6 hours PRN Pruritus  lanolin Ointment 1 Application(s) Topical every 6 hours PRN Sore Nipples  magnesium hydroxide Suspension 30 milliLiter(s) Oral two times a day PRN Constipation  oxyCODONE    IR 5 milliGRAM(s) Oral every 3 hours PRN Moderate to Severe Pain (4-10)  oxyCODONE    IR 5 milliGRAM(s) Oral once PRN Moderate to Severe Pain (4-10)  simethicone 80 milliGRAM(s) Chew every 4 hours PRN Gas      Labs:  Blood type: B Negative  Rubella IgG: RPR: Negative                          10.8<L>   10.09 >-----------< 151    (  @ 05:32 )             32.0<L>                        13.0   11.10<H> >-----------< 174    ( 11-10 @ 02:25 )             37.5                  PE:  General: NAD, patient resting comfortably in bed  Abdomen: Mildly distended, appropriately tender. Fundus firm.  Incision: Clean, dry, intact.  : appropriate amount of lochia on pad  Extremities: SCDs in place, no erythema

## 2022-11-12 NOTE — PROGRESS NOTE ADULT - ASSESSMENT
A/P: 31yo POD #1 s/p VA-pLTCS for category 2 tracing.  Patient is stable and doing well post-operatively.      #Postpartum recovery from  section,   - Continue regular diet.  - Increase ambulation.  - PO pain medication with Tylenol, Motrin and Oxycodone PRN for pain control.    - POD #1 CBC this morning: H/H 10/32  - DVT prophylaxis with Heparin 5000u BID    Rosenda Moncada PGY1  
  Assessment and Plan:   · Assessment	  A/P: 33yo POD #2 s/p VA-pLTCS for category 2 tracing.  s/p IV Invanz x 2 doses completed. Patient is stable and doing well post-operatively.   Patient has dermabond over incision. Left area of incision noted to have bruising, swelling noted below incision. No current oozing noted. Sun reports pulling on left side of incision     #Postpartum recovery from  section,     Her pain is well controlled.   She is tolerating a regular diet and passing flatus.   Denies N/V. Denies CP/SOB/lightheadedness/dizziness.   She is ambulating without difficulty.   Voiding spontaneously.    Patient is stable and doing well post-operatively.    - Continue regular diet.  - Increase ambulation.  - Continue motrin, tylenol, oxycodone PRN for pain control.   -Encourage breastfeeding.  -Incisional care and PO instructions reviewed.       PATIENT PENDING EVALUATION FROM MD RAMIREZ FOR CLEARANCE FOR DISCHARGE HOME TODAY    Follow up @ [facility] in 2 weeks for incision check visit  [phone]    Discussed with MD Ramirez    [facility]  
Assessment and Plan  POD #1 s/p vacuum assisted C/S.  Doing well and bonding with baby, good support at bedside  Rh negative- s/p rhogam injection  Encourage ambulation.  Incisional care and PO instructions reviewed.  CPC.

## 2022-12-03 LAB — SURGICAL PATHOLOGY STUDY: SIGNIFICANT CHANGE UP

## 2024-08-08 NOTE — OB RN PATIENT PROFILE - NS MD HP INPLANTS MED DEV
Patient had office visit with Dr. Cedeno on 8/6/24 with notes from Dr. Cedeno for him to discontinue Furosemide.    Please update medication profile to reflect this change. Thanks!   None